# Patient Record
Sex: FEMALE | NOT HISPANIC OR LATINO | Employment: OTHER | URBAN - METROPOLITAN AREA
[De-identification: names, ages, dates, MRNs, and addresses within clinical notes are randomized per-mention and may not be internally consistent; named-entity substitution may affect disease eponyms.]

---

## 2024-08-16 DIAGNOSIS — R07.9 CHEST PAIN: Primary | ICD-10-CM

## 2024-08-27 RX ORDER — HYDROCODONE BITARTRATE AND ACETAMINOPHEN 5; 325 MG/1; MG/1
1 TABLET ORAL EVERY 6 HOURS PRN
Qty: 1 TABLET | Refills: 0 | Status: SHIPPED | OUTPATIENT
Start: 2024-08-27

## 2024-09-08 ENCOUNTER — OFFICE VISIT (OUTPATIENT)
Dept: URGENT CARE | Facility: CLINIC | Age: 89
End: 2024-09-08

## 2024-09-08 ENCOUNTER — TELEPHONE (OUTPATIENT)
Dept: URGENT CARE | Facility: CLINIC | Age: 89
End: 2024-09-08

## 2024-09-08 DIAGNOSIS — Z11.1 PPD SCREENING TEST: Primary | ICD-10-CM

## 2024-09-08 LAB
TB INDURATION - 48 HR READ: 0 MM
TB INDURATION - 72 HR READ: NORMAL
TB SKIN TEST - 48 HR READ: NEGATIVE
TB SKIN TEST - 72 HR READ: NORMAL

## 2024-09-08 NOTE — PROGRESS NOTES
PPD Placement note  Lise Hernandez, 89 y.o. female is here today for placement of PPD test  Reason for PPD test: routine  Pt taken PPD test before: yes  Verified in allergy area and with patient that they are not allergic to the products PPD is made of (Phenol or Tween). Yes  Is patient taking any oral or IV steroid medication now or have they taken it in the last month? no  Has the patient ever received the BCG vaccine?: no  Has the patient been in recent contact with anyone known or suspected of having active TB disease?: no       Date of exposure (if applicable): n/a       Name of person they were exposed to (if applicable): n/a     O: Alert and oriented in NAD.  P:  PPD placed on 9/20/2024.  Patient advised to return for reading within 48-72 hours.   This encounter was created in error - please disregard.

## 2024-09-08 NOTE — PROGRESS NOTES
PPD Reading Note  PPD read and results entered in [employer].  Result: 0 mm induration.  Interpretation: negative  If test not read within 48-72 hours of initial placement, patient advised to repeat in other arm 1-3 weeks after this test.  Allergic reaction: no

## 2024-09-20 NOTE — PROGRESS NOTES
Subjective:      Patient ID: Lise Hernandez is a 89 y.o. female.    Vitals:  vitals were not taken for this visit.     Chief Complaint: Error    HPI  ROS   Objective:     Physical Exam    Assessment:     1. PPD screening test    2. ERRONEOUS ENCOUNTER--DISREGARD        Plan:       PPD screening test  -     POCT TB Skin Test Read    ERRONEOUS ENCOUNTER--DISREGARD              error

## 2024-09-20 NOTE — PROGRESS NOTES
Subjective:      Patient ID: Lise Hernandez is a 89 y.o. female.    Vitals:  vitals were not taken for this visit.     Chief Complaint: Error    HPI  ROS   Objective:     Physical Exam    Assessment:     1. PPD screening test    2. ERRONEOUS ENCOUNTER--DISREGARD        Plan:       PPD screening test  -     POCT TB Skin Test Read    ERRONEOUS ENCOUNTER--DISREGARD